# Patient Record
Sex: MALE | Race: WHITE | NOT HISPANIC OR LATINO | Employment: OTHER | URBAN - METROPOLITAN AREA
[De-identification: names, ages, dates, MRNs, and addresses within clinical notes are randomized per-mention and may not be internally consistent; named-entity substitution may affect disease eponyms.]

---

## 2024-07-17 NOTE — PROGRESS NOTES
"Subjective   Patient ID: Samir Patel \"Jordan\" is a 64 y.o. male who presents for Joint Pain (Joint issues mainly in hand but gout in big toe in the past ).    HPI  Consult  \"Painful joint    Prior urgent now better  Pain swelling several PIP and DIP joints  R 2nd for years  Told Gout Brittnee 10 th   Then PCP Dr. Brown   Lab to be done today  Tender r big toe severe red hot in [facility] 2023.   Took fluid L knee in past and not gout       ROS      No current outpatient medications on file.     No current facility-administered medications for this visit.         has no past medical history on file.   No past surgical history on file.       family history is not on file.  Pain Management Panel           No data to display                 Vitals:    07/24/24 0840   BP: 151/78   Pulse: 65   Resp: 18     No results found for: \"CBCDIF\", \"CMPLAS\", \"RF\", \"CCPIGGQUAL\", \"SEDRATE\", \"CRP\", \"TSH\"    PHYSICAL EXAM      NODES all stations negative  HEART  LUNGS  ABDOMEN   VASCULAR  NEURO normal muscle strength   SKINno rash  JOINTS limitation of motion of the right second digit severe without synovitis  Scattered hypertrophic PIP right fifth and IP left thumb  Diffusely swollen right second toe with normal range of motion great toe bilaterally    PLAN  Diagnoses and all orders for this visit:  Chronic gout of hand, unspecified cause, unspecified laterality  -     XR foot 3+ views bilateral; Future  -     XR hand 3+ views bilateral; Future    Mr. Patel is self-referred with PCP at [facility]    He developed a significant painful swollen right great toe while vacationing in [place] in December 2023 he needed to use a crutch and this resolved eventually    He has had a swollen and significant limited motion of his right second digit for many years  Then in June he had a flareup of several joints in both hands which are warm swollen and painful went to [facility] urgent care was diagnosed with probable gout and given 5-day course of prednisone 20 " mg which was moderately helpful.    Today he does not have any significant acute pain    On review of his records in 2014 he had a uric acid of 6.5    He is getting lab work today and I sent him for x-rays of both hands and feet    This most likely is gouty arthritis    I will see him back or at least speak to him for follow-up after reviewing his [facility] lab which is scheduled for today and review his x-rays.

## 2024-07-19 ENCOUNTER — APPOINTMENT (OUTPATIENT)
Dept: RHEUMATOLOGY | Facility: CLINIC | Age: 64
End: 2024-07-19
Payer: MEDICARE

## 2024-07-24 ENCOUNTER — HOSPITAL ENCOUNTER (OUTPATIENT)
Dept: RADIOLOGY | Facility: CLINIC | Age: 64
Discharge: HOME | End: 2024-07-24
Payer: MEDICARE

## 2024-07-24 ENCOUNTER — APPOINTMENT (OUTPATIENT)
Dept: RHEUMATOLOGY | Facility: CLINIC | Age: 64
End: 2024-07-24
Payer: MEDICARE

## 2024-07-24 VITALS — DIASTOLIC BLOOD PRESSURE: 78 MMHG | HEART RATE: 65 BPM | RESPIRATION RATE: 18 BRPM | SYSTOLIC BLOOD PRESSURE: 151 MMHG

## 2024-07-24 DIAGNOSIS — M1A.0490 CHRONIC GOUT OF HAND, UNSPECIFIED CAUSE, UNSPECIFIED LATERALITY: ICD-10-CM

## 2024-07-24 DIAGNOSIS — M1A.0490 CHRONIC GOUT OF HAND, UNSPECIFIED CAUSE, UNSPECIFIED LATERALITY: Primary | ICD-10-CM

## 2024-07-24 PROCEDURE — 99204 OFFICE O/P NEW MOD 45 MIN: CPT | Performed by: INTERNAL MEDICINE

## 2024-07-24 PROCEDURE — 1159F MED LIST DOCD IN RCRD: CPT | Performed by: INTERNAL MEDICINE

## 2024-07-24 PROCEDURE — 73630 X-RAY EXAM OF FOOT: CPT | Mod: BILATERAL PROCEDURE | Performed by: RADIOLOGY

## 2024-07-24 PROCEDURE — 73630 X-RAY EXAM OF FOOT: CPT | Mod: 50

## 2024-07-24 PROCEDURE — 73130 X-RAY EXAM OF HAND: CPT | Mod: BILATERAL PROCEDURE | Performed by: RADIOLOGY

## 2024-07-24 PROCEDURE — 73130 X-RAY EXAM OF HAND: CPT | Mod: 50

## 2024-08-28 ENCOUNTER — APPOINTMENT (OUTPATIENT)
Dept: RHEUMATOLOGY | Facility: CLINIC | Age: 64
End: 2024-08-28
Payer: MEDICARE

## 2024-09-26 NOTE — PROGRESS NOTES
"Subjective   Patient ID: Samir Patel \"Jordan\" is a 64 y.o. male who presents for Follow-up (Patients flare in thumb got better, discuss results of x-rays).    HPI fu   Consult  \"Painful joint    Prior urgent now better  Pain swelling several PIP and DIP joints  R 2nd for years  Told Gout Brittnee 10 th   Then PCP Dr. Brown   Lab to be done today  Tender r big toe severe red hot in [place] 2023.   Took fluid L knee in past and not gout       ROS      No current outpatient medications on file.     No current facility-administered medications for this visit.         has no past medical history on file.   No past surgical history on file.       family history is not on file.  Pain Management Panel           No data to display               Component  Ref Range & Units 2 mo ago   Uric Acid  4.0 - 8.1 mg/dL 6.9     Component  Ref Range & Units 2 mo ago Comments   Protein, Total  6.3 - 8.0 g/dL 7.5    Albumin  3.9 - 4.9 g/dL 4.4    Calcium, Total  8.5 - 10.2 mg/dL 10.0    Bilirubin, Total  0.2 - 1.3 mg/dL 0.7    Alkaline Phosphatase  38 - 113 U/L 51    AST  14 - 40 U/L 45 High     ALT  10 - 54 U/L 43    Glucose  74 - 99 mg/dL 106 High  The American Diabetes Association (ADA) provides guidance for cutoff values for fasting glucose and random glucose. The ADA defines fasting as no caloric intake for at least 8 hours. Fasting plasma glucose results between 100 to 125 mg/dL indicate increased risk for diabetes (prediabetes).  Fasting plasma glucose results greater than or equal to 126 mg/dL meet the criteria for diagnosis of diabetes. In the absence of unequivocal hyperglycemia, results should be confirmed by repeat testing. In a patient with classic symptoms of hyperglycemia or hyperglycemic crisis, random plasma glucose results greater than or equal to 200 mg/dL meet the criteria for diagnosis of diabetes.  Reference: Standards of Medical Care in Diabetes 2016, American Diabetes Association. Diabetes Care. 2016.39(Suppl 1).   BUN  9 - " "24 mg/dL 15    Creatinine  0.73 - 1.22 mg/dL 1.06    Sodium  136 - 144 mmol/L 137    Potassium  3.7 - 5.1 mmol/L 4.1    Chloride  98 - 107 mmol/L 100    CO2  22 - 30 mmol/L 25    Anion Gap  8 - 15 mmol/L 12    Estimated Glomerular Filtration Rate  >=60 mL/min/1.73m² 78      [facility]  Outside Information      Contains abnormal data COMPLETE BLOOD COUNT  Specimen: Blood - Blood specimen (specimen)  Component  Ref Range & Units 2 mo ago   WBC  3.70 - 11.00 k/uL 6.13   RBC  4.20 - 6.00 m/uL 4.05 Low    Hemoglobin  13.0 - 17.0 g/dL 13.2   Hematocrit  39.0 - 51.0 % 40.3   MCV  80.0 - 100.0 fL 99.5   MCH  26.0 - 34.0 pg 32.6   MCHC  30.5 - 36.0 g/dL 32.8   RDW-CV  11.5 - 15.0 % 12.9   Platelet Count  150 - 400 k/uL 194   MPV  9.0 - 12.7 fL 11.6   Absolute nRBC  <0.01 k/uL <0.01   Resulting Agency [facility]   Specimen Collected: 07/24/24 11:56    Performed by: [facility] Last Resulted: 07/24/24 19:33   Received From: [facility]  Result Received: 07/25/24 17:54     Recent Data from [facility]  Related to COMPLETE BLOOD COUNT  Component 07/24/24 01/10/17   WBC 6.13 6.80   RBC 4.05 Low  4.81   Hemoglobin 13.2 15.1   Hematocrit 40.3 44.4   MCV 99.5 92.3   MCH 32.6 31.4   MCHC 32.8 34.0   RDW-CV 12.9 12.4   Platelet Count 194 233   MPV 11.6          Vitals:    09/27/24 0851   BP: 149/69   Pulse: 59   Resp: 18       No results found for: \"CBCDIF\", \"CMPLAS\", \"RF\", \"CCPIGGQUAL\", \"SEDRATE\", \"CRP\", \"TSH\"  FINDINGS:  The right 5th DIP joint is ankylosed. DJD characterized by joint  space narrowing, sclerosis, bony hypertrophy/osteophytes and  subchondral cysts is greatest and pronounced at the left 1st CMC  joint, but is also seen at the same joint on the right and some of  the interphalangeal joints most notably the right 2nd PIP joint,  where mild varus angulation is noted. No recent fracture, dislocation  or destructive lesion is seen. 4-5 mm round lucencies with " thin  sclerotic margins in the radial aspects of the left 3rd and 4th  metacarpal heads are consistent with cysts.      IMPRESSION:  Bilateral osteoarthritis, greatest at the left 1st  metacarpal-multangular joint.    FINDINGS: l foot   No fracture, dislocation or destructive lesion is seen. Moderate  right 3rd and mild right 4th and left 3rd MTP joint valgus and right  3rd toe and mild left 3rd DIP joint varus curvature are present. 2nd  through 5th hammertoes with toe flexion and MTP joint extension seem  more pronounced on the right. There is no evidence of arthritis aside  from minimal interphalangeal joint DJD, but please note that gout  usually does not produce radiographic changes until its later stages.      IMPRESSION:  Bilateral toe and MTP joint deformities as described above.      Minimal degenerative changes.      PHYSICAL EXAM      NODES all stations negative  HEART  LUNGS  ABDOMEN   VASCULAR  NEURO normal muscle strength   SKINno rash  JOINTS limitation of motion of the right second digit severe without synovitis  Scattered hypertrophic PIP right fifth and IP left thumb  Diffusely swollen right second toe with normal range of motion great toe bilaterally    PLAN  There are no diagnoses linked to this encounter.    Mr. Patel is self-referred with PCP at [facility]    He developed a significant painful swollen right great toe while vacationing in [place] in December 2023 he needed to use a crutch and this resolved eventually    He has had a swollen and significant limited motion of his right second digit for many years  Then in June he had a flareup of several joints in both hands which are warm swollen and painful went to [facility] urgent care was diagnosed with probable gout and given 5-day course of prednisone 20 mg which was moderately helpful.    Today he does not have any significant acute pain    On review of his records in 2014 he had a uric acid of 6.5    He is getting lab work today and I  sent him for x-rays of both hands and feet    This most likely is gouty arthritis    Follow up today    On review of x rays I see erosions at DIP and PIP joints    He has both gout and OA    Should be treated for what we can treat with allopurinol with colchicine until uric acid is below 6.  Not much to do about the OA part of his condition.     He want to wait and see what happens over the next year or 2 and does not want any meds now.     He can reach out to me if needed in future.

## 2024-09-27 ENCOUNTER — APPOINTMENT (OUTPATIENT)
Dept: RHEUMATOLOGY | Facility: CLINIC | Age: 64
End: 2024-09-27
Payer: MEDICARE

## 2024-09-27 VITALS — SYSTOLIC BLOOD PRESSURE: 149 MMHG | HEART RATE: 59 BPM | DIASTOLIC BLOOD PRESSURE: 69 MMHG | RESPIRATION RATE: 18 BRPM

## 2024-09-27 DIAGNOSIS — M10.00 IDIOPATHIC GOUT, UNSPECIFIED CHRONICITY, UNSPECIFIED SITE: Primary | ICD-10-CM

## 2024-09-27 DIAGNOSIS — M19.042 OSTEOARTHRITIS OF BOTH HANDS, UNSPECIFIED OSTEOARTHRITIS TYPE: ICD-10-CM

## 2024-09-27 DIAGNOSIS — M19.041 OSTEOARTHRITIS OF BOTH HANDS, UNSPECIFIED OSTEOARTHRITIS TYPE: ICD-10-CM

## 2024-09-27 PROCEDURE — 1159F MED LIST DOCD IN RCRD: CPT | Performed by: INTERNAL MEDICINE

## 2024-09-27 PROCEDURE — 99214 OFFICE O/P EST MOD 30 MIN: CPT | Performed by: INTERNAL MEDICINE

## 2025-02-27 DIAGNOSIS — M1A.0490 CHRONIC GOUT OF HAND, UNSPECIFIED CAUSE, UNSPECIFIED LATERALITY: ICD-10-CM

## 2025-02-27 DIAGNOSIS — M05.749 RHEUMATOID ARTHRITIS INVOLVING HAND WITH POSITIVE RHEUMATOID FACTOR, UNSPECIFIED LATERALITY (MULTI): Primary | ICD-10-CM

## 2025-02-28 RX ORDER — PREDNISONE 5 MG/1
TABLET ORAL
Qty: 40 TABLET | Refills: 1 | Status: SHIPPED | OUTPATIENT
Start: 2025-02-28